# Patient Record
Sex: FEMALE | Race: WHITE | NOT HISPANIC OR LATINO | Employment: FULL TIME | URBAN - METROPOLITAN AREA
[De-identification: names, ages, dates, MRNs, and addresses within clinical notes are randomized per-mention and may not be internally consistent; named-entity substitution may affect disease eponyms.]

---

## 2018-08-22 DIAGNOSIS — Z00.00 ROUTINE GENERAL MEDICAL EXAMINATION AT A HEALTH CARE FACILITY: Primary | ICD-10-CM

## 2018-09-04 NOTE — PROGRESS NOTES
"Subjective:      Patient ID: Jessica Mills is a 32 y.o. female.    Chief Complaint: Annual Exam      HPI  Here for first year of Executive physical with [employer].  Energy ok.  Not exercising.  No f/c/sw/cough.  No cp/sob/palp. BMs and urine normal.    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    FH:  Dad HTN, HLP,CABG at 70yo,     HM:  2015 TDaP while pregnant, 6/18 Gyn.      Review of Systems   Constitutional: Negative for appetite change, chills, diaphoresis and fever.   HENT: Negative for congestion, ear pain, rhinorrhea, sinus pressure and sore throat.    Respiratory: Negative for cough, chest tightness and shortness of breath.    Cardiovascular: Negative for chest pain and palpitations.   Gastrointestinal: Negative for blood in stool, constipation, diarrhea, nausea and vomiting.   Genitourinary: Negative for dysuria, frequency, hematuria, menstrual problem, urgency and vaginal discharge.   Musculoskeletal: Negative for arthralgias.   Skin: Negative for rash.   Neurological: Negative for dizziness and headaches.   Psychiatric/Behavioral: Negative for sleep disturbance. The patient is not nervous/anxious.          Objective:   /60   Pulse 72   Temp 97.6 °F (36.4 °C) (Tympanic)   Ht 5' 4" (1.626 m)   Wt 54 kg (119 lb 0.8 oz)   LMP 08/30/2018 (Exact Date)   BMI 20.43 kg/m²     Physical Exam   Constitutional: She is oriented to person, place, and time. She appears well-developed and well-nourished.   HENT:   Right Ear: External ear normal. Tympanic membrane is not injected.   Left Ear: External ear normal. Tympanic membrane is not injected.   Mouth/Throat: Oropharynx is clear and moist.   Eyes: Conjunctivae are normal.   Neck: Normal range of motion. Neck " supple. No thyromegaly present.   Cardiovascular: Normal rate, regular rhythm and intact distal pulses. Exam reveals no gallop and no friction rub.   No murmur heard.  Pulmonary/Chest: Effort normal and breath sounds normal. She has no wheezes. She has no rales.   Abdominal: Soft. Bowel sounds are normal. She exhibits no mass. There is no tenderness.   Musculoskeletal: She exhibits no edema.   Lymphadenopathy:     She has no cervical adenopathy.   Neurological: She is alert and oriented to person, place, and time.   Skin: Skin is warm. No rash noted.   Psychiatric: She has a normal mood and affect.       Results for orders placed or performed in visit on 09/07/18   Comprehensive metabolic panel   Result Value Ref Range    Sodium 138 136 - 145 mmol/L    Potassium 4.3 3.5 - 5.1 mmol/L    Chloride 103 95 - 110 mmol/L    CO2 29 23 - 29 mmol/L    Glucose 89 70 - 110 mg/dL    BUN, Bld 9 6 - 20 mg/dL    Creatinine 0.8 0.5 - 1.4 mg/dL    Calcium 9.7 8.7 - 10.5 mg/dL    Total Protein 7.6 6.0 - 8.4 g/dL    Albumin 4.2 3.5 - 5.2 g/dL    Total Bilirubin 0.7 0.1 - 1.0 mg/dL    Alkaline Phosphatase 73 55 - 135 U/L    AST 15 10 - 40 U/L    ALT 14 10 - 44 U/L    Anion Gap 6 (L) 8 - 16 mmol/L    eGFR if African American >60 >60 mL/min/1.73 m^2    eGFR if non African American >60 >60 mL/min/1.73 m^2   CBC Without Differential   Result Value Ref Range    WBC 4.60 3.90 - 12.70 K/uL    RBC 4.42 4.00 - 5.40 M/uL    Hemoglobin 13.4 12.0 - 16.0 g/dL    Hematocrit 39.6 37.0 - 48.5 %    MCV 90 82 - 98 fL    MCH 30.3 27.0 - 31.0 pg    MCHC 33.8 32.0 - 36.0 g/dL    RDW 12.5 11.5 - 14.5 %    Platelets 230 150 - 350 K/uL    MPV 8.8 (L) 9.2 - 12.9 fL   Lipid panel   Result Value Ref Range    Cholesterol 213 (H) 120 - 199 mg/dL    Triglycerides 89 30 - 150 mg/dL    HDL 47 40 - 75 mg/dL    LDL Cholesterol 148.2 63.0 - 159.0 mg/dL    HDL/Chol Ratio 22.1 20.0 - 50.0 %    Total Cholesterol/HDL Ratio 4.5 2.0 - 5.0    Non-HDL Cholesterol 166 mg/dL        CXR normal.    Assessment:       1. Encounter for preventive health examination          Plan:     Encounter for preventive health examination- Report results when available.    dyslipid- start exercise and good fats.

## 2018-09-07 ENCOUNTER — CLINICAL SUPPORT (OUTPATIENT)
Dept: INTERNAL MEDICINE | Facility: CLINIC | Age: 33
End: 2018-09-07
Payer: COMMERCIAL

## 2018-09-07 ENCOUNTER — OFFICE VISIT (OUTPATIENT)
Dept: INTERNAL MEDICINE | Facility: CLINIC | Age: 33
End: 2018-09-07
Payer: COMMERCIAL

## 2018-09-07 ENCOUNTER — HOSPITAL ENCOUNTER (OUTPATIENT)
Dept: RADIOLOGY | Facility: HOSPITAL | Age: 33
Discharge: HOME | End: 2018-09-07
Attending: INTERNAL MEDICINE
Payer: COMMERCIAL

## 2018-09-07 VITALS
TEMPERATURE: 98 F | HEART RATE: 72 BPM | SYSTOLIC BLOOD PRESSURE: 100 MMHG | WEIGHT: 119.06 LBS | BODY MASS INDEX: 20.32 KG/M2 | DIASTOLIC BLOOD PRESSURE: 60 MMHG | HEIGHT: 64 IN

## 2018-09-07 VITALS
WEIGHT: 119.06 LBS | HEIGHT: 64 IN | RESPIRATION RATE: 14 BRPM | SYSTOLIC BLOOD PRESSURE: 100 MMHG | DIASTOLIC BLOOD PRESSURE: 60 MMHG | HEART RATE: 72 BPM | BODY MASS INDEX: 20.32 KG/M2

## 2018-09-07 DIAGNOSIS — Z00.00 ROUTINE GENERAL MEDICAL EXAMINATION AT A HEALTH CARE FACILITY: Primary | ICD-10-CM

## 2018-09-07 DIAGNOSIS — Z00.00 ENCOUNTER FOR PREVENTIVE HEALTH EXAMINATION: Primary | ICD-10-CM

## 2018-09-07 DIAGNOSIS — Z00.00 ROUTINE GENERAL MEDICAL EXAMINATION AT A HEALTH CARE FACILITY: ICD-10-CM

## 2018-09-07 LAB
ALBUMIN SERPL BCP-MCNC: 4.2 G/DL
ALP SERPL-CCNC: 73 U/L
ALT SERPL W/O P-5'-P-CCNC: 14 U/L
ANION GAP SERPL CALC-SCNC: 6 MMOL/L
AST SERPL-CCNC: 15 U/L
BILIRUB SERPL-MCNC: 0.7 MG/DL
BUN SERPL-MCNC: 9 MG/DL
CALCIUM SERPL-MCNC: 9.7 MG/DL
CHLORIDE SERPL-SCNC: 103 MMOL/L
CHOLEST SERPL-MCNC: 213 MG/DL
CHOLEST/HDLC SERPL: 4.5 {RATIO}
CO2 SERPL-SCNC: 29 MMOL/L
CREAT SERPL-MCNC: 0.8 MG/DL
ERYTHROCYTE [DISTWIDTH] IN BLOOD BY AUTOMATED COUNT: 12.5 %
EST. GFR  (AFRICAN AMERICAN): >60 ML/MIN/1.73 M^2
EST. GFR  (NON AFRICAN AMERICAN): >60 ML/MIN/1.73 M^2
ESTIMATED AVG GLUCOSE: 91 MG/DL
GLUCOSE SERPL-MCNC: 89 MG/DL
HBA1C MFR BLD HPLC: 4.8 %
HCT VFR BLD AUTO: 39.6 %
HDLC SERPL-MCNC: 47 MG/DL
HDLC SERPL: 22.1 %
HGB BLD-MCNC: 13.4 G/DL
LDLC SERPL CALC-MCNC: 148.2 MG/DL
MCH RBC QN AUTO: 30.3 PG
MCHC RBC AUTO-ENTMCNC: 33.8 G/DL
MCV RBC AUTO: 90 FL
NONHDLC SERPL-MCNC: 166 MG/DL
PLATELET # BLD AUTO: 230 K/UL
PMV BLD AUTO: 8.8 FL
POTASSIUM SERPL-SCNC: 4.3 MMOL/L
PROT SERPL-MCNC: 7.6 G/DL
RBC # BLD AUTO: 4.42 M/UL
SODIUM SERPL-SCNC: 138 MMOL/L
TRIGL SERPL-MCNC: 89 MG/DL
WBC # BLD AUTO: 4.6 K/UL

## 2018-09-07 PROCEDURE — 71046 X-RAY EXAM CHEST 2 VIEWS: CPT | Mod: TC,FY,PO

## 2018-09-07 PROCEDURE — 86703 HIV-1/HIV-2 1 RESULT ANTBDY: CPT

## 2018-09-07 PROCEDURE — 99395 PREV VISIT EST AGE 18-39: CPT | Mod: S$GLB,,, | Performed by: INTERNAL MEDICINE

## 2018-09-07 PROCEDURE — 83036 HEMOGLOBIN GLYCOSYLATED A1C: CPT

## 2018-09-07 PROCEDURE — 99999 PR PBB SHADOW E&M-EST. PATIENT-LVL III: CPT | Mod: PBBFAC,,, | Performed by: INTERNAL MEDICINE

## 2018-09-07 PROCEDURE — 80061 LIPID PANEL: CPT | Mod: PO

## 2018-09-07 PROCEDURE — 80053 COMPREHEN METABOLIC PANEL: CPT | Mod: PO

## 2018-09-07 PROCEDURE — 85027 COMPLETE CBC AUTOMATED: CPT | Mod: PO

## 2018-09-07 PROCEDURE — 71046 X-RAY EXAM CHEST 2 VIEWS: CPT | Mod: 26,,, | Performed by: RADIOLOGY

## 2018-09-07 NOTE — LETTER
September 12, 2018    Jessica Mills  [address] [place] [address]             [facility]  [address] [place] [address]  Phone: [phone]  Fax: [phone] [facility] # [id number]    Dear Ms. Mills:    It was a pleasure to meet you and perform your  executive physical on September 7, 2018.  At the time  of your visit, you are 32 years old.  You reported an  average energy level.  You have not been exercising  lately.  You denied any fevers, sweats, or coughing  problems.  You have had no chest pain, shortness of  breath or palpitations.  You have had no bowel or  bladder problems.    Your past medical history is significant for  gestational diabetes and hypertension in pregnancy.   You have had eye surgery and eye muscle surgery.  You  take no prescription medications.  You have no known  medication allergies.    Your health maintenance includes 2015 tetanus,  diphtheria, pertussis vaccination during pregnancy;  June 2018, gynecologic exam.    On physical exam, your height is 5 feet 4 inches,  weight 119 pounds with a body mass index 20.43.  This  is in the healthy range.  Your blood pressure 100/60,  pulse 72, and temperature 97.6.  Your general  appearance is well developed with no distress.  Your  head and neck exam is normal.  Your heart has a regular  rate and rhythm with no abnormal sounds.  Your lungs  are clear throughout with a normal respiratory effort.   Your abdomen is soft with normal bowel sounds, no mass  or enlarged organs are noted.  Your extremities have  strong distal pulses with no swelling.    Your labs reveal a normal blood count.  Your platelets  230, normal.  Your kidney and liver function is normal.   Your fasting glucose 89, normal.  Total cholesterol  213, HDL 47, , triglyceride 89.  This is a  mildly abnormal cholesterol panel, which may improve  with exercise, which can raise your HDL and lower your  LDL, which would be favorable.   Hemoglobin A1c 4.8%,  normal, which is excellent and shows no diabetes or  prediabetes at this time.  HIV test is negative.  Your  chest x-ray is clear.    In summary, you appear to be in pretty good health  overall.  You do need to start some regular exercise  and focus on eating good fats, which include olive oil,  avocado, fish, nuts.  You are up to date on all  vaccinations and preventive care needed at your age.    It was a pleasure to see you and perform your executive  physical.  If I can be of any further assistance or if  you have any other questions or concerns, please do not  hesitate to let me know.    Yours very truly,    Gay Abdalla M.D.      EREN  dd: 09/12/2018 10:09:33 (CDT)  td: 09/12/2018 19:31:59 (CDT)  Doc ID   #[id number]  Job ID #[id number]    CC:

## 2018-09-10 LAB — HIV 1+2 AB+HIV1 P24 AG SERPL QL IA: NEGATIVE

## 2023-03-23 NOTE — PROGRESS NOTES
"Nutrition Assessment  Session Time:        Client name:  Jessica Mills  :  1985  Age:  37 y.o.  Gender:  female    Client states:  Spouse of a [employer] employee.   Present for Executive Health physical.  Patient's spouse is present at the same time to complete their nutrition appointment today.    Reports no questions or concerns regarding current nutrition intake.   Gave blood in January and received report of elevated cholesterol.   Unsure of specific level that was elevated.  Did not receive call from staff to discuss results.   No known history of previously elevated cholesterol.  Reports she was not fasting and questioned if that caused abnormality in results.    No other concerns.    Reports balanced diet mostly.   Vegetable intake typically comes from tomatoes, cucumbers, salads, and green beans due to their children liking those sources.     Anthropometrics  Height:  64"     Weight:  128 lbs  BMI:  21.97  % Body Fat:  n/a    Clinical Signs/Symptoms  N/V/D:  none noted  Appetite:  good       Past Medical History:   Diagnosis Date    Gestational diabetes     Hypertension in pregnancy        Past Surgical History:   Procedure Laterality Date    COSMETIC SURGERY      ear surgery    EYE MUSCLE SURGERY      3 times       Medications    has a current medication list which includes the following prescription(s): sertraline.    Vitamins, Minerals, and/or Supplements:  not discussed     Food/Medication Interactions:  Reviewed     Food Allergies or Intolerances:  NKFA     Social History    Marital status:    Employment:  not discussed    Social History     Tobacco Use    Smoking status: Never    Smokeless tobacco: Never   Substance Use Topics    Alcohol use: Yes     Comment: rarely         Lab Reports   Sodium   Date Value Ref Range Status   2018 138 136 - 145 mmol/L Final     Potassium   Date Value Ref Range Status   2018 4.3 3.5 - 5.1 mmol/L Final     Chloride   Date " Value Ref Range Status   09/07/2018 103 95 - 110 mmol/L Final     CO2   Date Value Ref Range Status   09/07/2018 29 23 - 29 mmol/L Final     Glucose   Date Value Ref Range Status   09/07/2018 89 70 - 110 mg/dL Final     BUN   Date Value Ref Range Status   09/07/2018 9 6 - 20 mg/dL Final     Creatinine   Date Value Ref Range Status   09/07/2018 0.8 0.5 - 1.4 mg/dL Final     Calcium   Date Value Ref Range Status   09/07/2018 9.7 8.7 - 10.5 mg/dL Final     Total Protein   Date Value Ref Range Status   09/07/2018 7.6 6.0 - 8.4 g/dL Final     Albumin   Date Value Ref Range Status   09/07/2018 4.2 3.5 - 5.2 g/dL Final     Total Bilirubin   Date Value Ref Range Status   09/07/2018 0.7 0.1 - 1.0 mg/dL Final     Comment:     For infants and newborns, interpretation of results should be based  on gestational age, weight and in agreement with clinical  observations.  Premature Infant recommended reference ranges:  Up to 24 hours.............<8.0 mg/dL  Up to 48 hours............<12.0 mg/dL  3-5 days..................<15.0 mg/dL  6-29 days.................<15.0 mg/dL       Alkaline Phosphatase   Date Value Ref Range Status   09/07/2018 73 55 - 135 U/L Final     AST   Date Value Ref Range Status   09/07/2018 15 10 - 40 U/L Final     ALT   Date Value Ref Range Status   09/07/2018 14 10 - 44 U/L Final     Anion Gap   Date Value Ref Range Status   09/07/2018 6 (L) 8 - 16 mmol/L Final     eGFR if    Date Value Ref Range Status   09/07/2018 >60 >60 mL/min/1.73 m^2 Final     eGFR if non    Date Value Ref Range Status   09/07/2018 >60 >60 mL/min/1.73 m^2 Final     Comment:     Calculation used to obtain the estimated glomerular filtration  rate (eGFR) is the CKD-EPI equation.         Lab Results   Component Value Date    WBC 4.60 09/07/2018    HGB 13.4 09/07/2018    HCT 39.6 09/07/2018    MCV 90 09/07/2018     09/07/2018        Lab Results   Component Value Date    CHOL 213 (H) 09/07/2018     Lab  Results   Component Value Date    HDL 47 09/07/2018     Lab Results   Component Value Date    LDLCALC 148.2 09/07/2018     Lab Results   Component Value Date    TRIG 89 09/07/2018     Lab Results   Component Value Date    CHOLHDL 22.1 09/07/2018     Lab Results   Component Value Date    HGBA1C 4.8 09/07/2018     BP Readings from Last 1 Encounters:   09/07/18 100/60       Food History  No recall    Exercise History:  acitve    Cultural/Spiritual/Personal Preferences:  None identified    Support System:  spouse    State of Change:  Precontemplation    Barriers to Change:  none    Diagnosis  No nutrition diagnosis at the moment.     Intervention    RMR (Method:  [place] Jeor):  1249 kcal  Activity Factor:  1.3    LESTER:  1623 kcal    Goals:  1.  No goals were established today.       Nutrition Education  The following education was provided to the patient:  *Lab results were pending at time of consult and so, not discussed with patient.  Encouraged patient and spouse to call if they would like to discuss any health or nutrition information.     Patient verbalized understanding of nutrition education and recommendations received.    Handouts Provided  Meal Planning Guide  Eat Fit Shopping List  Fueling Well On-The-Go  Plate Method  Snack List    Monitoring/Evaluation    Monitor the following:  Weight  BMI  Caloric intake  Labs:  lipid panel, HgbA1c    Follow Up Plan:  Communication with referring healthcare provider is unnecessary at this time as patient presented as part of annual wellness exam.  However, will follow up with patient in 1-2 years.

## 2023-03-24 ENCOUNTER — OFFICE VISIT (OUTPATIENT)
Dept: INTERNAL MEDICINE | Facility: CLINIC | Age: 38
End: 2023-03-24
Payer: COMMERCIAL

## 2023-03-24 ENCOUNTER — CLINICAL SUPPORT (OUTPATIENT)
Dept: INTERNAL MEDICINE | Facility: CLINIC | Age: 38
End: 2023-03-24
Payer: COMMERCIAL

## 2023-03-24 VITALS
SYSTOLIC BLOOD PRESSURE: 120 MMHG | OXYGEN SATURATION: 98 % | HEIGHT: 64 IN | WEIGHT: 128 LBS | HEART RATE: 66 BPM | TEMPERATURE: 98 F | DIASTOLIC BLOOD PRESSURE: 77 MMHG | BODY MASS INDEX: 21.85 KG/M2

## 2023-03-24 DIAGNOSIS — Z00.00 ROUTINE GENERAL MEDICAL EXAMINATION AT A HEALTH CARE FACILITY: Primary | ICD-10-CM

## 2023-03-24 DIAGNOSIS — Z71.3 DIETARY COUNSELING: Primary | ICD-10-CM

## 2023-03-24 DIAGNOSIS — F41.9 ANXIETY: ICD-10-CM

## 2023-03-24 DIAGNOSIS — Z23 NEED FOR TDAP VACCINATION: ICD-10-CM

## 2023-03-24 LAB
ALBUMIN SERPL BCP-MCNC: 4.1 G/DL (ref 3.5–5.2)
ALP SERPL-CCNC: 73 U/L (ref 55–135)
ALT SERPL W/O P-5'-P-CCNC: 11 U/L (ref 10–44)
ANION GAP SERPL CALC-SCNC: 8 MMOL/L (ref 8–16)
AST SERPL-CCNC: 14 U/L (ref 10–40)
BILIRUB SERPL-MCNC: 0.5 MG/DL (ref 0.1–1)
BUN SERPL-MCNC: 12 MG/DL (ref 6–20)
CALCIUM SERPL-MCNC: 9.6 MG/DL (ref 8.7–10.5)
CHLORIDE SERPL-SCNC: 102 MMOL/L (ref 95–110)
CHOLEST SERPL-MCNC: 259 MG/DL (ref 120–199)
CHOLEST/HDLC SERPL: 5.6 {RATIO} (ref 2–5)
CO2 SERPL-SCNC: 26 MMOL/L (ref 23–29)
CREAT SERPL-MCNC: 0.7 MG/DL (ref 0.5–1.4)
ERYTHROCYTE [DISTWIDTH] IN BLOOD BY AUTOMATED COUNT: 12.3 % (ref 11.5–14.5)
EST. GFR  (NO RACE VARIABLE): >60 ML/MIN/1.73 M^2
ESTIMATED AVG GLUCOSE: 94 MG/DL (ref 68–131)
GLUCOSE SERPL-MCNC: 83 MG/DL (ref 70–110)
HBA1C MFR BLD: 4.9 % (ref 4–5.6)
HCT VFR BLD AUTO: 38.1 % (ref 37–48.5)
HDLC SERPL-MCNC: 46 MG/DL (ref 40–75)
HDLC SERPL: 17.8 % (ref 20–50)
HGB BLD-MCNC: 12.6 G/DL (ref 12–16)
LDLC SERPL CALC-MCNC: 195.4 MG/DL (ref 63–159)
MCH RBC QN AUTO: 28.7 PG (ref 27–31)
MCHC RBC AUTO-ENTMCNC: 33.1 G/DL (ref 32–36)
MCV RBC AUTO: 87 FL (ref 82–98)
NONHDLC SERPL-MCNC: 213 MG/DL
PLATELET # BLD AUTO: 262 K/UL (ref 150–450)
PMV BLD AUTO: 8.7 FL (ref 9.2–12.9)
POTASSIUM SERPL-SCNC: 3.9 MMOL/L (ref 3.5–5.1)
PROT SERPL-MCNC: 7.8 G/DL (ref 6–8.4)
RBC # BLD AUTO: 4.39 M/UL (ref 4–5.4)
SODIUM SERPL-SCNC: 136 MMOL/L (ref 136–145)
TRIGL SERPL-MCNC: 88 MG/DL (ref 30–150)
WBC # BLD AUTO: 4.34 K/UL (ref 3.9–12.7)

## 2023-03-24 PROCEDURE — 99385 PREV VISIT NEW AGE 18-39: CPT | Mod: 25,,, | Performed by: INTERNAL MEDICINE

## 2023-03-24 PROCEDURE — 80053 COMPREHEN METABOLIC PANEL: CPT | Performed by: INTERNAL MEDICINE

## 2023-03-24 PROCEDURE — 85027 COMPLETE CBC AUTOMATED: CPT | Performed by: INTERNAL MEDICINE

## 2023-03-24 PROCEDURE — 97802 MEDICAL NUTRITION INDIV IN: CPT | Mod: S$GLB,,, | Performed by: DIETITIAN, REGISTERED

## 2023-03-24 PROCEDURE — 90471 IMMUNIZATION ADMIN: CPT | Mod: ,,, | Performed by: INTERNAL MEDICINE

## 2023-03-24 PROCEDURE — 97802 PR MED NUTR THER, 1ST, INDIV, EA 15 MIN: ICD-10-PCS | Mod: S$GLB,,, | Performed by: DIETITIAN, REGISTERED

## 2023-03-24 PROCEDURE — 80061 LIPID PANEL: CPT | Performed by: INTERNAL MEDICINE

## 2023-03-24 PROCEDURE — 99385 PR PREVENTIVE VISIT,NEW,18-39: ICD-10-PCS | Mod: 25,,, | Performed by: INTERNAL MEDICINE

## 2023-03-24 PROCEDURE — 83036 HEMOGLOBIN GLYCOSYLATED A1C: CPT | Performed by: INTERNAL MEDICINE

## 2023-03-24 PROCEDURE — 90715 TDAP VACCINE 7 YRS/> IM: CPT | Mod: ,,, | Performed by: INTERNAL MEDICINE

## 2023-03-24 PROCEDURE — 86803 HEPATITIS C AB TEST: CPT | Performed by: INTERNAL MEDICINE

## 2023-03-24 PROCEDURE — 99999 PR PBB SHADOW E&M-EST. PATIENT-LVL III: ICD-10-PCS | Mod: PBBFAC,,, | Performed by: INTERNAL MEDICINE

## 2023-03-24 PROCEDURE — 90715 TDAP VACCINE GREATER THAN OR EQUAL TO 7YO IM: ICD-10-PCS | Mod: ,,, | Performed by: INTERNAL MEDICINE

## 2023-03-24 PROCEDURE — 90471 TDAP VACCINE GREATER THAN OR EQUAL TO 7YO IM: ICD-10-PCS | Mod: ,,, | Performed by: INTERNAL MEDICINE

## 2023-03-24 PROCEDURE — 99999 PR PBB SHADOW E&M-EST. PATIENT-LVL III: CPT | Mod: PBBFAC,,, | Performed by: INTERNAL MEDICINE

## 2023-03-24 NOTE — PROGRESS NOTES
"Subjective:      Patient ID: Jessiac Mills is a 37 y.o. female.    Chief Complaint: Executive Health    HPI    It was a pleasure to see you for your executive health physical on March 24, 2023.    You are a 37-year-old with a past medical history of gestational diabetes and hypertension in pregnancy and anxiety.    You currently take zoloft and have no known drug allergies.    Your past surgical history includes eye muscle surgery and cosmetic surgery.    You do not smoke.    Your family history includes a father with high blood pressure, high cholesterol, heart disease with bypass surgery in his 70s. Mother healthy. Brother with history of thyroid cancer but siblings otherwise healthy.     Preventive healthcare:  Tetanus vaccine-up-to-date and given on 3/24/23  You reported up-to-date with your gyn.  Flu vaccine - declines.     Review of Systems   Constitutional:  Negative for chills and fever.   HENT:  Negative for ear pain and sore throat.    Respiratory:  Negative for cough.    Cardiovascular:  Negative for chest pain.   Gastrointestinal:  Negative for abdominal pain and blood in stool.   Genitourinary:  Negative for dysuria and hematuria.   Neurological:  Negative for seizures and syncope.   Objective:   /77   Pulse 66   Temp 97.7 °F (36.5 °C)   Ht 5' 4" (1.626 m)   Wt 58.1 kg (128 lb)   SpO2 98%   BMI 21.97 kg/m²     Physical Exam  Constitutional:       General: She is not in acute distress.     Appearance: She is well-developed.   HENT:      Head: Normocephalic and atraumatic.   Eyes:      Extraocular Movements: Extraocular movements intact.   Neck:      Thyroid: No thyromegaly.   Cardiovascular:      Rate and Rhythm: Normal rate and regular rhythm.   Pulmonary:      Breath sounds: Normal breath sounds. No wheezing or rales.   Abdominal:      General: Bowel sounds are normal.      Palpations: Abdomen is soft.      Tenderness: There is no abdominal tenderness.   Musculoskeletal:         General: " No swelling.      Cervical back: Neck supple. No rigidity.   Lymphadenopathy:      Cervical: No cervical adenopathy.   Skin:     General: Skin is warm and dry.   Neurological:      Mental Status: She is alert and oriented to person, place, and time.   Psychiatric:         Behavior: Behavior normal.       Lab Results   Component Value Date    WBC 4.34 03/24/2023    HGB 12.6 03/24/2023    HGB 13.4 09/07/2018    HCT 38.1 03/24/2023    MCV 87 03/24/2023    MCV 90 09/07/2018     03/24/2023    CHOL 259 (H) 03/24/2023    TRIG 88 03/24/2023    HDL 46 03/24/2023    LDLCALC 195.4 (H) 03/24/2023    LDLCALC 148.2 09/07/2018    ALT 11 03/24/2023    AST 14 03/24/2023     03/24/2023    K 3.9 03/24/2023     03/24/2023    CO2 26 03/24/2023    BUN 12 03/24/2023    CREATININE 0.7 03/24/2023    CREATININE 0.8 09/07/2018    EGFRNORACEVR >60 03/24/2023    GLU 83 03/24/2023    HGBA1C 4.9 03/24/2023    HGBA1C 4.8 09/07/2018          The ASCVD Risk score (Nancie DK, et al., 2019) failed to calculate for the following reasons:    The 2019 ASCVD risk score is only valid for ages 40 to 79     Assessment:     1. Routine general medical examination at a health care facility    2. Need for Tdap vaccination    3. Anxiety      Plan:   1. Routine general medical examination at a health care facility  -     Hepatitis C Antibody; Future; Expected date: 03/24/2023    2. Need for Tdap vaccination  -     (In Office Administered) Tdap Vaccine    3. Anxiety    Heart healthy diet, regular exercise, and regular use of sunscreen.   HM reviewed  See exec health letter for details.     There are no Patient Instructions on file for this visit.    Future Appointments   Date Time Provider Department Center   4/27/2023  3:30 PM Cordelia Llanes MD [facility] [place] [facility]           No follow-ups on file.

## 2023-03-25 LAB — HCV AB SERPL QL IA: NORMAL
